# Patient Record
Sex: FEMALE | Race: WHITE | URBAN - METROPOLITAN AREA
[De-identification: names, ages, dates, MRNs, and addresses within clinical notes are randomized per-mention and may not be internally consistent; named-entity substitution may affect disease eponyms.]

---

## 2018-11-24 ENCOUNTER — TRANSFERRED RECORDS (OUTPATIENT)
Dept: HEALTH INFORMATION MANAGEMENT | Facility: CLINIC | Age: 23
End: 2018-11-24

## 2018-11-28 ENCOUNTER — OFFICE VISIT (OUTPATIENT)
Dept: NEUROLOGY | Facility: CLINIC | Age: 23
End: 2018-11-28
Payer: COMMERCIAL

## 2018-11-28 VITALS
BODY MASS INDEX: 23.79 KG/M2 | HEART RATE: 84 BPM | RESPIRATION RATE: 15 BRPM | WEIGHT: 126 LBS | DIASTOLIC BLOOD PRESSURE: 72 MMHG | OXYGEN SATURATION: 99 % | HEIGHT: 61 IN | TEMPERATURE: 98.6 F | SYSTOLIC BLOOD PRESSURE: 104 MMHG

## 2018-11-28 DIAGNOSIS — G50.0 TRIGEMINAL NEURALGIA: ICD-10-CM

## 2018-11-28 DIAGNOSIS — G50.0 TRIGEMINAL NEURALGIA: Primary | ICD-10-CM

## 2018-11-28 PROBLEM — E04.1 THYROID NODULE: Status: ACTIVE | Noted: 2018-05-30

## 2018-11-28 PROBLEM — F43.10 POSTTRAUMATIC STRESS DISORDER: Status: ACTIVE | Noted: 2017-06-07

## 2018-11-28 PROBLEM — F41.1 GAD (GENERALIZED ANXIETY DISORDER): Status: ACTIVE | Noted: 2018-07-24

## 2018-11-28 PROBLEM — G25.81 RESTLESS LEG SYNDROME: Status: ACTIVE | Noted: 2018-07-24

## 2018-11-28 PROBLEM — F41.9 ANXIETY: Status: ACTIVE | Noted: 2017-06-07

## 2018-11-28 PROBLEM — F33.1 MODERATE EPISODE OF RECURRENT MAJOR DEPRESSIVE DISORDER (H): Status: ACTIVE | Noted: 2018-07-24

## 2018-11-28 PROBLEM — R68.82 DECREASED LIBIDO: Status: ACTIVE | Noted: 2017-06-07

## 2018-11-28 PROBLEM — F33.2 SEVERE EPISODE OF RECURRENT MAJOR DEPRESSIVE DISORDER (H): Status: ACTIVE | Noted: 2018-07-24

## 2018-11-28 LAB
ALBUMIN SERPL-MCNC: 3.8 G/DL (ref 3.4–5)
ALP SERPL-CCNC: 78 U/L (ref 40–150)
ALT SERPL W P-5'-P-CCNC: 20 U/L (ref 0–50)
ANION GAP SERPL CALCULATED.3IONS-SCNC: 8 MMOL/L (ref 3–14)
AST SERPL W P-5'-P-CCNC: 12 U/L (ref 0–45)
BASOPHILS # BLD AUTO: 0.1 10E9/L (ref 0–0.2)
BASOPHILS NFR BLD AUTO: 0.8 %
BILIRUB SERPL-MCNC: 0.2 MG/DL (ref 0.2–1.3)
BUN SERPL-MCNC: 12 MG/DL (ref 7–30)
CALCIUM SERPL-MCNC: 8.6 MG/DL (ref 8.5–10.1)
CHLORIDE SERPL-SCNC: 106 MMOL/L (ref 94–109)
CO2 SERPL-SCNC: 26 MMOL/L (ref 20–32)
CREAT SERPL-MCNC: 0.73 MG/DL (ref 0.52–1.04)
DIFFERENTIAL METHOD BLD: ABNORMAL
EOSINOPHIL # BLD AUTO: 0.9 10E9/L (ref 0–0.7)
EOSINOPHIL NFR BLD AUTO: 10.1 %
ERYTHROCYTE [DISTWIDTH] IN BLOOD BY AUTOMATED COUNT: 11.5 % (ref 10–15)
GFR SERPL CREATININE-BSD FRML MDRD: >90 ML/MIN/1.7M2
GLUCOSE SERPL-MCNC: 85 MG/DL (ref 70–99)
HCT VFR BLD AUTO: 36.9 % (ref 35–47)
HGB BLD-MCNC: 12.3 G/DL (ref 11.7–15.7)
IMM GRANULOCYTES # BLD: 0 10E9/L (ref 0–0.4)
IMM GRANULOCYTES NFR BLD: 0.2 %
LYMPHOCYTES # BLD AUTO: 3.1 10E9/L (ref 0.8–5.3)
LYMPHOCYTES NFR BLD AUTO: 36.5 %
MCH RBC QN AUTO: 31 PG (ref 26.5–33)
MCHC RBC AUTO-ENTMCNC: 33.3 G/DL (ref 31.5–36.5)
MCV RBC AUTO: 93 FL (ref 78–100)
MONOCYTES # BLD AUTO: 0.6 10E9/L (ref 0–1.3)
MONOCYTES NFR BLD AUTO: 7.5 %
NEUTROPHILS # BLD AUTO: 3.8 10E9/L (ref 1.6–8.3)
NEUTROPHILS NFR BLD AUTO: 44.9 %
NRBC # BLD AUTO: 0 10*3/UL
NRBC BLD AUTO-RTO: 0 /100
PLATELET # BLD AUTO: 286 10E9/L (ref 150–450)
POTASSIUM SERPL-SCNC: 3.9 MMOL/L (ref 3.4–5.3)
PROT SERPL-MCNC: 7.2 G/DL (ref 6.8–8.8)
RBC # BLD AUTO: 3.97 10E12/L (ref 3.8–5.2)
SODIUM SERPL-SCNC: 140 MMOL/L (ref 133–144)
WBC # BLD AUTO: 8.4 10E9/L (ref 4–11)

## 2018-11-28 RX ORDER — GABAPENTIN 300 MG/1
CAPSULE ORAL
Refills: 3 | COMMUNITY
Start: 2018-10-30

## 2018-11-28 RX ORDER — CARBAMAZEPINE 100 MG/1
100 TABLET, EXTENDED RELEASE ORAL 2 TIMES DAILY
Qty: 60 TABLET | Refills: 0 | Status: SHIPPED | OUTPATIENT
Start: 2018-11-28

## 2018-11-28 ASSESSMENT — PATIENT HEALTH QUESTIONNAIRE - PHQ9: SUM OF ALL RESPONSES TO PHQ QUESTIONS 1-9: 1

## 2018-11-28 ASSESSMENT — PAIN SCALES - GENERAL: PAINLEVEL: SEVERE PAIN (6)

## 2018-11-28 NOTE — LETTER
Date:November 29, 2018      Patient was self referred, no letter generated. Do not send.        [facility] Health Information

## 2018-11-28 NOTE — PROGRESS NOTES
[facility]  Neurology Consult     Katherine Bauer MRN# [id number]   YOB: 1995 Age: 23 year old     Requesting physician: Dr. Zamorano         Assessment and Recommendations:   Katherine Bauer is a 23 year old female with a history of recent root canal and tooth extraction on November 9, 2018 who was referred to the neurology clinic for further evaluation of right face pain.    Her presentation with right lower jaw pain that is burning and shocking in nature that is intermittent and triggered by eating, chewing, moving her face, or talking over the last 3 years is concerning for trigeminal neuralgia affecting the right V3 branch.  Her history is somewhat complicated given her recent dental procedures and tooth extraction earlier this month, as well as a reported right lower jaw fracture secondary to the procedures, which are also likely causing jaw discomfort.  On exam, the recent tooth extraction is noted, without any clear signs of infection.  Otherwise, her neurologic exam is intact today.    Going forward, it somewhat unusual to have trigeminal neuralgia presenting at age 23.  I have ordered an MRI of the brain with and without contrast for further evaluation of underlying causes, such as vascular loop compression, small tumor, or multiple sclerosis.  I also ordered a complete metabolic panel and complete blood count with differential as baseline screening labs for carbamazepine.  If her labs are within normal limits, she may start carbamazepine at 100 mg twice a day.  These will need to be rechecked periodically if she continues to take carbamazepine.    I will review the results of her MRI and update her via a message on [facility], per her preference.  I will plan to see her back in 3 months to monitor her progress.    Ivonne Winston MD  Neurology  Pager: [phone]            Chief Complaint:   Chief Complaint   Patient presents with     Consult      [facility] NEW POSSIBLE NERVE DAMAGE           History is obtained from the patient, mother, and medical record.      Katherine Bauer is a 23 year old female who has been suffering from right lower jaw pain intermittently over the last 3 years.  She describes the pain as burning, stinging, shocking pain that is very severe when it occurs, rating it 8 or 9 out of 10.  Her pain tends to occur in flares that last days or a week, and her pain is triggered by specific activities.  The pain occurs when eating, chewing, moving her face or jaw, or when talking.  In the past, she describes a pattern of a week of flared pain followed by 2 weeks of remission, repeating a variable but similar pattern approximately every month.  Currently, her pain is been going on the last 3 weeks.    Over this time, she sought care from her dentist, out of concern that her right molar was infected.  She underwent treatment for possible infection as well as 3 root canals and one tooth extraction, without change in her pain.  Her recent extraction was November 9, 2018.  She tells me that she also has a right lower jaw fracture following her most recent procedure.    She denies other neurologic history, with the exception of restless leg syndrome, for which she takes gabapentin 900 mg nightly.  She denies any changes in her vision, numbness, or weakness anywhere in her body.            Past Medical History:   She is a history of restless leg syndrome, thyroid nodules, and previous root canals and orthodontic care.          Past Surgical History:   She has had a tonsillectomy and keloid repair over her ears bilaterally.          Social History:   She is planning to travel to [place] for 2 weeks for training for her job.  She works for [employer] will be based in [place].  Social History     Social History     Marital status: Single     Spouse name: N/A     Number of children: N/A     Years of education: N/A     Occupational History     Not on  "file.     Social History Main Topics     Smoking status: Never Smoker     Smokeless tobacco: Never Used     Alcohol use Not on file     Drug use: Not on file     Sexual activity: Not on file     Other Topics Concern     Not on file     Social History Narrative             Family History:   There is a family history of trigeminal neuralgia in her grandmother, and diabetes in her father.          Allergies:    No Known Allergies          Medications:     Current Outpatient Prescriptions:      acetaminophen-codeine (TYLENOL #3) 300-30 MG tablet, TAKE 1 TABLET BY MY MOUTH EVERY 4-6 HOURS AS NEEDED FOR PAIN, Disp: , Rfl: 0     carBAMazepine (TEGRETOL XR) 100 MG 12 hr tablet, Take 1 tablet (100 mg) by mouth 2 times daily, Disp: 60 tablet, Rfl: 0     gabapentin (NEURONTIN) 300 MG capsule, TAKE ONE CAPSULE BY MOUTH EVERY 8 HOURS AS NEEDED FOR ANXIETY. MAY TAKE 2 1 HR BEFORE SLEEP, Disp: , Rfl: 3     guaiFENesin-codeine (ROBITUSSIN AC) 100-10 MG/5ML SOLN, Take 10 mLs by mouth every 4 hours as needed for cough (Patient not taking: Reported on 11/28/2018), Disp: 120 mL, Rfl: 0     ibuprofen (ADVIL,MOTRIN) 800 MG tablet, Take 1 tablet (800 mg) by mouth every 8 hours as needed for moderate pain (Patient not taking: Reported on 11/28/2018), Disp: 30 tablet, Rfl: 0          Review of Systems:   Negative, except as noted in the HPI.         Physical Exam:   /72  Pulse 84  Temp 98.6  F (37  C) (Oral)  Resp 15  Ht 1.549 m (5' 1\")  Wt 57.2 kg (126 lb)  SpO2 99%  Breastfeeding? No  BMI 23.81 kg/m2   Physical Exam:   General: NAD  Neurologic:   Mental Status Exam: Alert, awake and oriented to situation. No dysarthria. Speech of normal fluency.   Cranial Nerves: Funduscopic exam with clear disc margins bilaterally.  PERRLA, EOMs intact, no nystagmus, facial movements symmetric, facial sensation intact to light touch, hearing intact to conversation, palate and uvula rise symmetrically, no deviation in uvula or tongue, " trapezius and SCMs 5/5 bilaterally, tongue midline and fully mobile. No atrophy or fasciculations.    Motor: Normal tone in all four extremities, no atrophy or fasciculations. 5/5 strength bilaterally in shoulder abduction, elbow extensors and flexors, , hip flexors, knee extensors and flexors, dorsi- and plantarflexion. No tremors.   Sensory: Sensation intact to light touch and vibration on arms and legs bilaterally.    Coordination: Finger-nose-finger intact bilaterally.  Rapidly alternating movements intact bilaterally.  Normal Romberg.   Reflexes: 2+ and symmetric in triceps, biceps, brachioradialis, patellar, Achilles, and plantars downgoing bilaterally.   Gait: Normal gait. Tandem gait normal.  Head: Normocephalic, atraumatic  Neck: Normal range of motion  Eyes: No conjunctival injection, no scleral icterus.   Mouth: No erythema or exudate in the oropharynx.  Recent right lower molar extraction noted, with granulation tissue seen.  There is no edema or erythema to suggest infection.  Respiratory: No increased work of breathing  Cardiovascular: No lower extremity edema  Extremities: Warm, dry           Data:     No previous head imaging with CT or MRI

## 2018-11-28 NOTE — LETTER
11/28/2018       RE: Katherine Bauer  [address]  [place] [address]     Dear Colleague,    Thank you for referring your patient, Katherine Bauer, to the [facility] NEUROLOGY at [facility]. Please see a copy of my visit note below.    [facility]   Clinics and Surgery Center  Neurology Consult     Katherine Bauer MRN# [id number]   YOB: 1995 Age: 23 year old     Requesting physician: Dr. Zamorano         Assessment and Recommendations:   Katherine Bauer is a 23 year old female with a history of recent root canal and tooth extraction on November 9, 2018 who was referred to the neurology clinic for further evaluation of right face pain.    Her presentation with right lower jaw pain that is burning and shocking in nature that is intermittent and triggered by eating, chewing, moving her face, or talking over the last 3 years is concerning for trigeminal neuralgia affecting the right V3 branch.  Her history is somewhat complicated given her recent dental procedures and tooth extraction earlier this month, as well as a reported right lower jaw fracture secondary to the procedures, which are also likely causing jaw discomfort.  On exam, the recent tooth extraction is noted, without any clear signs of infection.  Otherwise, her neurologic exam is intact today.    Going forward, it somewhat unusual to have trigeminal neuralgia presenting at age 23.  I have ordered an MRI of the brain with and without contrast for further evaluation of underlying causes, such as vascular loop compression, small tumor, or multiple sclerosis.  I also ordered a complete metabolic panel and complete blood count with differential as baseline screening labs for carbamazepine.  If her labs are within normal limits, she may start carbamazepine at 100 mg twice a day.  These will need to be rechecked periodically if she continues to take carbamazepine.    I will review the  results of her MRI and update her via a message on [employer], per her preference.  I will plan to see her back in 3 months to monitor her progress.    Ivonne Winston MD  Neurology  Pager: [phone]            Chief Complaint:   Chief Complaint   Patient presents with     Consult     P NEW POSSIBLE NERVE DAMAGE           History is obtained from the patient, mother, and medical record.      Katherine Bauer is a 23 year old female who has been suffering from right lower jaw pain intermittently over the last 3 years.  She describes the pain as burning, stinging, shocking pain that is very severe when it occurs, rating it 8 or 9 out of 10.  Her pain tends to occur in flares that last days or a week, and her pain is triggered by specific activities.  The pain occurs when eating, chewing, moving her face or jaw, or when talking.  In the past, she describes a pattern of a week of flared pain followed by 2 weeks of remission, repeating a variable but similar pattern approximately every month.  Currently, her pain is been going on the last 3 weeks.    Over this time, she sought care from her dentist, out of concern that her right molar was infected.  She underwent treatment for possible infection as well as 3 root canals and one tooth extraction, without change in her pain.  Her recent extraction was November 9, 2018.  She tells me that she also has a right lower jaw fracture following her most recent procedure.    She denies other neurologic history, with the exception of restless leg syndrome, for which she takes gabapentin 900 mg nightly.  She denies any changes in her vision, numbness, or weakness anywhere in her body.            Past Medical History:   She is a history of restless leg syndrome, thyroid nodules, and previous root canals and orthodontic care.          Past Surgical History:   She has had a tonsillectomy and keloid repair over her ears bilaterally.          Social History:   She is planning to travel to  [place] for 2 weeks for training for her job.  She works for [employer] will be based in [place].  Social History     Social History     Marital status: Single     Spouse name: N/A     Number of children: N/A     Years of education: N/A     Occupational History     Not on file.     Social History Main Topics     Smoking status: Never Smoker     Smokeless tobacco: Never Used     Alcohol use Not on file     Drug use: Not on file     Sexual activity: Not on file     Other Topics Concern     Not on file     Social History Narrative             Family History:   There is a family history of trigeminal neuralgia in her grandmother, and diabetes in her father.          Allergies:    No Known Allergies          Medications:     Current Outpatient Prescriptions:      acetaminophen-codeine (TYLENOL #3) 300-30 MG tablet, TAKE 1 TABLET BY MY MOUTH EVERY 4-6 HOURS AS NEEDED FOR PAIN, Disp: , Rfl: 0     carBAMazepine (TEGRETOL XR) 100 MG 12 hr tablet, Take 1 tablet (100 mg) by mouth 2 times daily, Disp: 60 tablet, Rfl: 0     gabapentin (NEURONTIN) 300 MG capsule, TAKE ONE CAPSULE BY MOUTH EVERY 8 HOURS AS NEEDED FOR ANXIETY. MAY TAKE 2 1 HR BEFORE SLEEP, Disp: , Rfl: 3     guaiFENesin-codeine (ROBITUSSIN AC) 100-10 MG/5ML SOLN, Take 10 mLs by mouth every 4 hours as needed for cough (Patient not taking: Reported on 11/28/2018), Disp: 120 mL, Rfl: 0     ibuprofen (ADVIL,MOTRIN) 800 MG tablet, Take 1 tablet (800 mg) by mouth every 8 hours as needed for moderate pain (Patient not taking: Reported on 11/28/2018), Disp: 30 tablet, Rfl: 0          Review of Systems:   Negative, except as noted in the HPI.         Physical Exam:   /72  Pulse 84  Temp 98.6  F (37  C) (Oral)  Resp 15  Ht 1.549 m (5' 1\")  Wt 57.2 kg (126 lb)  SpO2 99%  Breastfeeding? No  BMI 23.81 kg/m2   Physical Exam:   General: NAD  Neurologic:   Mental Status Exam: Alert, awake and oriented to situation. No dysarthria. Speech of normal " fluency.   Cranial Nerves: Funduscopic exam with clear disc margins bilaterally.  PERRLA, EOMs intact, no nystagmus, facial movements symmetric, facial sensation intact to light touch, hearing intact to conversation, palate and uvula rise symmetrically, no deviation in uvula or tongue, trapezius and SCMs 5/5 bilaterally, tongue midline and fully mobile. No atrophy or fasciculations.    Motor: Normal tone in all four extremities, no atrophy or fasciculations. 5/5 strength bilaterally in shoulder abduction, elbow extensors and flexors, , hip flexors, knee extensors and flexors, dorsi- and plantarflexion. No tremors.   Sensory: Sensation intact to light touch and vibration on arms and legs bilaterally.    Coordination: Finger-nose-finger intact bilaterally.  Rapidly alternating movements intact bilaterally.  Normal Romberg.   Reflexes: 2+ and symmetric in triceps, biceps, brachioradialis, patellar, Achilles, and plantars downgoing bilaterally.   Gait: Normal gait. Tandem gait normal.  Head: Normocephalic, atraumatic  Neck: Normal range of motion  Eyes: No conjunctival injection, no scleral icterus.   Mouth: No erythema or exudate in the oropharynx.  Recent right lower molar extraction noted, with granulation tissue seen.  There is no edema or erythema to suggest infection.  Respiratory: No increased work of breathing  Cardiovascular: No lower extremity edema  Extremities: Warm, dry           Data:     No previous head imaging with CT or MRI      Again, thank you for allowing me to participate in the care of your patient.      Sincerely,    Ivonne Winston MD

## 2018-11-28 NOTE — MR AVS SNAPSHOT
After Visit Summary   11/28/2018    Katherine Bauer    MRN: [id number]           Patient Information     Date Of Birth          1995        Visit Information        Provider Department      11/28/2018 7:00 AM Ivonne Winston MD [facility] Neurology        Today's Diagnoses     Trigeminal neuralgia    -  1       Follow-ups after your visit        Follow-up notes from your care team     Return in about 3 months (around 2/28/2019).      Your next 10 appointments already scheduled     Nov 28, 2018  8:00 AM CST   LAB with  LAB   [facility] Lab ([facility])    [address] [place] [address] [id number] [phone]           Please do not eat 10-12 hours before your appointment if you are coming in fasting for labs on lipids, cholesterol, or glucose (sugar). This does not apply to pregnant women. Water, hot tea and black coffee (with nothing added) are okay. Do not drink other fluids, diet soda or chew gum.            Feb 19, 2019  2:00 PM CST   (Arrive by 1:45 PM)   Return Visit with Ivonne Winston MD   [facility] Neurology ([facility])    [address] [id number] [phone]              Future tests that were ordered for you today     Open Future Orders        Priority Expected Expires Ordered    Comprehensive metabolic panel Routine  11/28/2019 11/28/2018    CBC with platelets differential Routine  11/28/2019 11/28/2018    MRI Brain w & w/o contrast Routine  11/28/2019 11/28/2018            Who to contact     Please call your clinic at [phone] to:    Ask questions about your health    Make or cancel appointments    Discuss your medicines    Learn about your test results    Speak to your doctor            Additional Information About Your Visit        [employer] Information     [employer] is an electronic gateway that provides easy, online access to your medical records. With [employer], you can  "request a clinic appointment, read your test results, renew a prescription or communicate with your care team.     To sign up for [employer] visit the website at [email or web address]   You will be asked to enter the access code listed below, as well as some personal information. Please follow the directions to create your username and password.     Your access code is: [email or web address]  Expires: 2019  6:30 AM     Your access code will  in 90 days. If you need help or a new code, please contact your [facility] or call [phone] for assistance.        Care EveryWhere ID     This is your Care EveryWhere ID. This could be used by other organizations to access your [place] medical records  [id number]        Your Vitals Were     Pulse Temperature Respirations Height Pulse Oximetry Breastfeeding?    84 98.6  F (37  C) (Oral) 15 1.549 m (5' 1\") 99% No    BMI (Body Mass Index)                   23.81 kg/m2            Blood Pressure from Last 3 Encounters:   18 104/72   16 102/70    Weight from Last 3 Encounters:   18 57.2 kg (126 lb)   16 49 kg (108 lb)                 Today's Medication Changes          These changes are accurate as of 18  7:44 AM.  If you have any questions, ask your nurse or doctor.               Start taking these medicines.        Dose/Directions    carBAMazepine 100 MG 12 hr tablet   Commonly known as:  TEGretol XR   Used for:  Trigeminal neuralgia   Started by:  Ivonne Winston MD        Dose:  100 mg   Take 1 tablet (100 mg) by mouth 2 times daily   Quantity:  60 tablet   Refills:  0            Where to get your medicines      These medications were sent to [place] [address] [place] [facility] [place] [facility] [address] [address]  [address] [place] [address]     Phone:  [phone]     carBAMazepine 100 MG 12 hr tablet               Information about OPIOIDS     PRESCRIPTION OPIOIDS: WHAT YOU NEED TO KNOW   We gave " you an opioid (narcotic) pain medicine. It is important to manage your pain, but opioids are not always the best choice. You should first try all the other options your care team gave you. Take this medicine for as short a time (and as few doses) as possible.    Some activities can increase your pain, such as bandage changes or therapy sessions. It may help to take your pain medicine 30 to 60 minutes before these activities. Reduce your stress by getting enough sleep, working on hobbies you enjoy and practicing relaxation or meditation. Talk to your care team about ways to manage your pain beyond prescription opioids.    These medicines have risks:    DO NOT drive when on new or higher doses of pain medicine. These medicines can affect your alertness and reaction times, and you could be arrested for driving under the influence (DUI). If you need to use opioids long-term, talk to your care team about driving.    DO NOT operate heavy machinery    DO NOT do any other dangerous activities while taking these medicines.    DO NOT drink any alcohol while taking these medicines.     If the opioid prescribed includes acetaminophen, DO NOT take with any other medicines that contain acetaminophen. Read all labels carefully. Look for the word  acetaminophen  or  Tylenol.  Ask your pharmacist if you have questions or are unsure.    You can get addicted to pain medicines, especially if you have a history of addiction (chemical, alcohol or substance dependence). Talk to your care team about ways to reduce this risk.    All opioids tend to cause constipation. Drink plenty of water and eat foods that have a lot of fiber, such as fruits, vegetables, prune juice, apple juice and high-fiber cereal. Take a laxative (Miralax, milk of magnesia, Colace, Senna) if you don t move your bowels at least every other day. Other side effects include upset stomach, sleepiness, dizziness, throwing up, tolerance (needing more of the medicine to have  the same effect), physical dependence and slowed breathing.    Store your pills in a secure place, locked if possible. We will not replace any lost or stolen medicine. If you don t finish your medicine, please throw away (dispose) as directed by your pharmacist. The [place] Pollution Control Agency has more information about safe disposal: [email or web address]/[email or web address]-medications         Primary Care Provider    None Specified       No primary provider on file.        Equal Access to Services     ERA NOBLES : Hadii lazaro ku hadasho Soomaali, waaxda luqadaha, qaybta kaalmada adeegyada, waxverna steinbergraleighesbas bronson . So [facility] [phone].    ATENCIÓN: Si britta chahal, tiene a pascal disposición servicios gratuitos de asistencia lingüística. Morenoame al [phone].    We comply with applicable federal civil rights laws and [place] laws. We do not discriminate on the basis of race, color, national origin, age, disability, sex, sexual orientation, or gender identity.            Thank you!     Thank you for choosing [facility] NEUROLOGY  for your care. Our goal is always to provide you with excellent care. Hearing back from our patients is one way we can continue to improve our services. Please take a few minutes to complete the written survey that you may receive in the mail after your visit with us. Thank you!             Your Updated Medication List - Protect others around you: Learn how to safely use, store and throw away your medicines at www.disposemymeds.org.          This list is accurate as of 11/28/18  7:44 AM.  Always use your most recent med list.                   Brand Name Dispense Instructions for use Diagnosis    acetaminophen-codeine 300-30 MG tablet    TYLENOL #3     TAKE 1 TABLET BY MY MOUTH EVERY 4-6 HOURS AS NEEDED FOR PAIN        carBAMazepine 100 MG 12 hr tablet    TEGretol XR    60 tablet    Take 1 tablet (100 mg) by mouth 2 times daily    Trigeminal  neuralgia       gabapentin 300 MG capsule    NEURONTIN     TAKE ONE CAPSULE BY MOUTH EVERY 8 HOURS AS NEEDED FOR ANXIETY. MAY TAKE 2 1 HR BEFORE SLEEP        guaiFENesin-codeine 100-10 MG/5ML Soln solution    ROBITUSSIN AC    120 mL    Take 10 mLs by mouth every 4 hours as needed for cough    Influenza-like syndrome       ibuprofen 800 MG tablet    ADVIL/MOTRIN    30 tablet    Take 1 tablet (800 mg) by mouth every 8 hours as needed for moderate pain    Influenza-like syndrome

## 2018-11-28 NOTE — NURSING NOTE
Chief Complaint   Patient presents with     Consult     [facility] NEW POSSIBLE NERVE DAMAGE     Luís Schuler, EMT